# Patient Record
Sex: FEMALE | URBAN - METROPOLITAN AREA
[De-identification: names, ages, dates, MRNs, and addresses within clinical notes are randomized per-mention and may not be internally consistent; named-entity substitution may affect disease eponyms.]

---

## 2019-05-01 ENCOUNTER — APPOINTMENT (RX ONLY)
Dept: URBAN - METROPOLITAN AREA CLINIC 100 | Facility: CLINIC | Age: 44
Setting detail: DERMATOLOGY
End: 2019-05-01

## 2019-05-01 DIAGNOSIS — Z41.9 ENCOUNTER FOR PROCEDURE FOR PURPOSES OTHER THAN REMEDYING HEALTH STATE, UNSPECIFIED: ICD-10-CM

## 2019-05-01 PROCEDURE — ? FILLERS

## 2019-05-01 NOTE — PROCEDURE: FILLERS
Additional Area 2 Volume In Cc: 0
Additional Area 2 Location: Lips, oral commissure, glabellar fine fines
Additional Area 2 Location: Nasalabial, Glabellar fine lines- Complimentary
Include Cannula Information In Note?: No
Additional Area 4 Location: Oral commissure, Lateral cheeks,lat mouth
Additional Area 3 Location: Glabbellar fine lines, Nasalabial folds, Marionette lines, vermillion lip
Additional Area 1 Location: lateral mouth, fine lines perioral, marionette lines, lateral cheeks, vermillion lips
Additional Area 5 Location: Cheeks, vermillion lips, marionette fine lines, glabellar fine lines, lateral mouth
Detail Level: Zone
Additional Area 4 Location: Perioral
Lot #: [id number]
Anesthesia Type: 1% lidocaine without epinephrine
Price (Use Numbers Only, No Special Characters Or $): 0.00
Expiration Date (Month Year): 09/30/2021
Expiration Date (Month Year): 08/31/2019
Lot #: [id number]
Lot #: [id number]
Expiration Date (Month Year): 09/2021
Consent: Written consent obtained. Risks include but not limited to bruising, beading, irregular texture, ulceration, infection, allergic reaction, scar formation, incomplete augmentation, temporary nature, procedural pain.
Additional Anesthesia Volume In Cc: 6
Post-Care Instructions: Patient instructed to apply ice to reduce swelling.
Map Statment: See [address] for Complete Details
Additional Area 1 Location: Lateral jawline, marionette lines
Include Cannula Size?: 25G
Additional Area 1 Volume In Cc: 1
Include Cannula Brand?: DermaSculpt
Additional Area 2 Location: lateral mouth fine lines, marionette fine lines, perioral fine lines
Include Cannula Length?: 1.5 inch
Additional Area 1 Location: lateral mouth, perioral fine lines, vermillion lips, marionette lines
Filler: Quinn Peterson